# Patient Record
Sex: FEMALE | Race: WHITE | Employment: FULL TIME | URBAN - METROPOLITAN AREA
[De-identification: names, ages, dates, MRNs, and addresses within clinical notes are randomized per-mention and may not be internally consistent; named-entity substitution may affect disease eponyms.]

---

## 2024-08-24 ENCOUNTER — OFFICE VISIT (OUTPATIENT)
Age: 19
End: 2024-08-24

## 2024-08-24 VITALS
BODY MASS INDEX: 34.75 KG/M2 | HEART RATE: 107 BPM | SYSTOLIC BLOOD PRESSURE: 110 MMHG | HEIGHT: 60 IN | TEMPERATURE: 99.5 F | DIASTOLIC BLOOD PRESSURE: 80 MMHG | OXYGEN SATURATION: 98 % | RESPIRATION RATE: 16 BRPM | WEIGHT: 177 LBS

## 2024-08-24 DIAGNOSIS — J02.9 SORE THROAT: ICD-10-CM

## 2024-08-24 DIAGNOSIS — J06.9 UPPER RESPIRATORY TRACT INFECTION, UNSPECIFIED TYPE: Primary | ICD-10-CM

## 2024-08-24 LAB
Lab: NORMAL
PERFORMING INSTRUMENT: NORMAL
QC PASS/FAIL: NORMAL
SARS-COV-2, POC: NORMAL
STREP PYOGENES DNA, POC: NEGATIVE
VALID INTERNAL CONTROL, POC: YES

## 2024-08-24 RX ORDER — NORGESTIMATE AND ETHINYL ESTRADIOL 0.25-0.035
KIT ORAL
COMMUNITY
Start: 2020-06-30 | End: 2024-08-24 | Stop reason: CLARIF

## 2024-08-24 RX ORDER — FLUTICASONE PROPIONATE 50 MCG
SPRAY, SUSPENSION (ML) NASAL
COMMUNITY
Start: 2016-05-13 | End: 2024-08-24 | Stop reason: CLARIF

## 2024-08-24 RX ORDER — CITALOPRAM HYDROBROMIDE 10 MG/1
1 TABLET ORAL
COMMUNITY
Start: 2017-05-24 | End: 2024-08-24 | Stop reason: CLARIF

## 2024-08-24 NOTE — PATIENT INSTRUCTIONS
Upper respiratory infection - likely viral  Rapid strep and Covid negative  Continue acetaminophen or ibuprofen for fever as needed  Continue over the counter cough and cold remedies as needed  Rest  Drink plenty fluids  Call or return to clinic if no improvement or any worsening

## 2024-08-24 NOTE — PROGRESS NOTES
Sarah Dalal (:  2005) is a 19 y.o. female,New patient, here for evaluation of the following chief complaint(s):  Sore Throat (Pt c/o possible strep went to work was still hurting, fever of 101.6 around 11:30 temp went up to 102. When coughing hurts but does not feel like its at the top but lower down. Headaches, body aches and chills when fever was going on )        SUBJECTIVE/OBJECTIVE:    History provided by:  Patient       19 y.o. female presents with symptoms of:  Fever Th night and last night.  So far today is a little better.  Highest temp was 102 at home.  Has not taken anything today and temp was 99.5.  Has a sore/irritated throat but states it does not hurt to swallow, soreness feels deeper, causing a cough, just recently produce of mucus.  Not much runny nose or nasal congestion.  Has been taking ibuprofen and Tylenol and Robitussin.  Asking for a work note.  Covid tested at home this morning and was negative.         Vitals:    24 0825   BP: 110/80   Site: Right Upper Arm   Position: Sitting   Cuff Size: Medium Adult   Pulse: (!) 107   Resp: 16   Temp: 99.5 °F (37.5 °C)   TempSrc: Oral   SpO2: 98%   Weight: 80.3 kg (177 lb)   Height: 1.524 m (5')       Results for orders placed or performed in visit on 24   AMB POC STREP GO A DIRECT, DNA PROBE   Result Value Ref Range    Valid Internal Control, POC yes     Strep pyogenes DNA, POC Negative Not Detected   POCT COVID-19, Antigen   Result Value Ref Range    SARS-COV-2, POC Not-Detected Not Detected    Lot Number [id number]     QC Pass/Fail PASS     Performing Instrument BinaxNOW         Physical Exam  Constitutional:       General: She is not in acute distress.     Appearance: Normal appearance. She is not ill-appearing or toxic-appearing.   HENT:      Head: Normocephalic and atraumatic.      Right Ear: Tympanic membrane, ear canal and external ear normal.      Left Ear: Tympanic membrane, ear canal and external ear normal.      Nose:

## 2024-08-28 ENCOUNTER — OFFICE VISIT (OUTPATIENT)
Age: 19
End: 2024-08-28

## 2024-08-28 VITALS
BODY MASS INDEX: 37.03 KG/M2 | HEIGHT: 63 IN | OXYGEN SATURATION: 95 % | RESPIRATION RATE: 16 BRPM | TEMPERATURE: 98.7 F | SYSTOLIC BLOOD PRESSURE: 134 MMHG | DIASTOLIC BLOOD PRESSURE: 84 MMHG | HEART RATE: 88 BPM | WEIGHT: 209 LBS

## 2024-08-28 DIAGNOSIS — R50.9 FEVER, UNSPECIFIED FEVER CAUSE: ICD-10-CM

## 2024-08-28 DIAGNOSIS — J22 LOWER RESPIRATORY INFECTION: Primary | ICD-10-CM

## 2024-08-28 DIAGNOSIS — R06.02 SOB (SHORTNESS OF BREATH): ICD-10-CM

## 2024-08-28 DIAGNOSIS — R05.1 ACUTE COUGH: ICD-10-CM

## 2024-08-28 RX ORDER — AZITHROMYCIN 250 MG/1
TABLET, FILM COATED ORAL
Qty: 6 TABLET | Refills: 0 | Status: SHIPPED | OUTPATIENT
Start: 2024-08-28 | End: 2024-09-07

## 2024-08-28 RX ORDER — GUAIFENESIN 200 MG/10ML
200 LIQUID ORAL 3 TIMES DAILY PRN
Qty: 210 ML | Refills: 0 | Status: SHIPPED | OUTPATIENT
Start: 2024-08-28 | End: 2024-09-04

## 2024-08-28 RX ORDER — ALBUTEROL SULFATE 0.83 MG/ML
2.5 SOLUTION RESPIRATORY (INHALATION) 4 TIMES DAILY PRN
Qty: 120 EACH | Refills: 3 | Status: SHIPPED | OUTPATIENT
Start: 2024-08-28

## 2024-08-28 RX ORDER — ALBUTEROL SULFATE 90 UG/1
2 AEROSOL, METERED RESPIRATORY (INHALATION) EVERY 6 HOURS PRN
Qty: 18 G | Refills: 0 | Status: SHIPPED | OUTPATIENT
Start: 2024-08-28

## 2024-08-28 RX ORDER — IBUPROFEN 600 MG/1
600 TABLET, FILM COATED ORAL 3 TIMES DAILY PRN
Qty: 30 TABLET | Refills: 0 | Status: SHIPPED | OUTPATIENT
Start: 2024-08-28

## 2024-08-28 RX ORDER — PREDNISONE 10 MG/1
TABLET ORAL
Qty: 1 EACH | Refills: 0 | Status: SHIPPED | OUTPATIENT
Start: 2024-08-28

## 2024-08-28 ASSESSMENT — ENCOUNTER SYMPTOMS: WHEEZING: 1

## 2024-08-28 NOTE — PATIENT INSTRUCTIONS
Please use albuterol inhaler and neb treatments for SOB as prescribed. This may elevate  your heart rate.  Please take ibuprofen for fever.  Take antibiotic and prednisone for a lower respiratory infection.Finish this medication.  Follow up with your PCP in 7 days.  Call or return to clinic if no improvement or any worsening

## 2024-08-28 NOTE — PROGRESS NOTES
this medication.  Follow up with your PCP in 7 days.  Call or return to clinic if no improvement or any worsening  Patient comfortable with plan         SUBJECTIVE/OBJECTIVE:    Fever   Associated symptoms include wheezing.   Pharyngitis  Associated symptoms: fever and wheezing    Wheezing   Associated symptoms include a fever.         19 y.o. female presents with symptoms of fever of 103 this am, sore throat, and wheezing. Symptoms started on Thursday. Was previously seen in . Symptoms are worsening. She is wheezing in bilateral lung fields posteriorly. She is coughing and c/o throat pain from coughing. She is also endorsing some SOB when coughing. Does not have a past history of asthma or other respiratory conditions.    Vitals:    08/28/24 1002   BP: 134/84   Site: Right Upper Arm   Position: Sitting   Cuff Size: Medium Adult   Pulse: 88   Resp: 16   Temp: 98.7 °F (37.1 °C)   TempSrc: Oral   SpO2: 95%   Weight: 94.8 kg (209 lb)   Height: 1.6 m (5' 3\")           Physical Exam  Vitals and nursing note reviewed.   Constitutional:       General: She is not in acute distress.     Appearance: Normal appearance. She is normal weight. She is diaphoretic. She is not ill-appearing or toxic-appearing.   HENT:      Head: Normocephalic and atraumatic.      Right Ear: Tympanic membrane, ear canal and external ear normal. There is no impacted cerumen.      Left Ear: Tympanic membrane, ear canal and external ear normal. There is no impacted cerumen.      Nose: Nose normal.      Mouth/Throat:      Mouth: Mucous membranes are moist.      Pharynx: Posterior oropharyngeal erythema present.   Eyes:      Extraocular Movements: Extraocular movements intact.      Conjunctiva/sclera: Conjunctivae normal.      Pupils: Pupils are equal, round, and reactive to light.   Cardiovascular:      Rate and Rhythm: Normal rate.      Pulses: Normal pulses.      Heart sounds: Normal heart sounds. No murmur heard.     No friction rub. No gallop.    Pulmonary:      Effort: No respiratory distress.      Breath sounds: No stridor. Wheezing present. No rhonchi or rales.      Comments: Cough, wheezing  Chest:      Chest wall: No tenderness.   Musculoskeletal:         General: Normal range of motion.   Lymphadenopathy:      Cervical: Cervical adenopathy present.   Skin:     General: Skin is warm.   Neurological:      Mental Status: She is alert.         We discussed when to utilize emergency services. Patient verbalized understanding.    An electronic signature was used to authenticate this note.    MINA Torres - CNP

## 2024-09-24 DIAGNOSIS — R05.1 ACUTE COUGH: ICD-10-CM

## 2024-09-24 DIAGNOSIS — R06.02 SOB (SHORTNESS OF BREATH): ICD-10-CM

## 2024-09-24 RX ORDER — ALBUTEROL SULFATE 90 UG/1
2 INHALANT RESPIRATORY (INHALATION) EVERY 6 HOURS PRN
Qty: 18 EACH | OUTPATIENT
Start: 2024-09-24